# Patient Record
Sex: FEMALE | Race: WHITE
[De-identification: names, ages, dates, MRNs, and addresses within clinical notes are randomized per-mention and may not be internally consistent; named-entity substitution may affect disease eponyms.]

---

## 2020-01-09 ENCOUNTER — HOSPITAL ENCOUNTER (EMERGENCY)
Age: 50
Discharge: HOME | End: 2020-01-09
Payer: MEDICAID

## 2020-01-09 DIAGNOSIS — F17.210: ICD-10-CM

## 2020-01-09 DIAGNOSIS — I10: ICD-10-CM

## 2020-01-09 DIAGNOSIS — M54.5: Primary | ICD-10-CM

## 2020-01-09 DIAGNOSIS — M79.662: ICD-10-CM

## 2020-01-29 ENCOUNTER — HOSPITAL ENCOUNTER (OUTPATIENT)
Age: 50
Discharge: HOME | End: 2020-01-29
Payer: MEDICAID

## 2020-01-29 DIAGNOSIS — M46.1: ICD-10-CM

## 2020-01-29 DIAGNOSIS — M54.5: Primary | ICD-10-CM

## 2021-06-09 ENCOUNTER — HOSPITAL ENCOUNTER (OUTPATIENT)
Dept: HOSPITAL 82 - ORM | Age: 51
LOS: 1 days | Discharge: HOME | End: 2021-06-10
Attending: PAIN MEDICINE
Payer: MEDICAID

## 2021-06-09 VITALS — DIASTOLIC BLOOD PRESSURE: 70 MMHG | SYSTOLIC BLOOD PRESSURE: 114 MMHG

## 2021-06-09 DIAGNOSIS — M54.16: Primary | ICD-10-CM

## 2021-08-18 ENCOUNTER — HOSPITAL ENCOUNTER (OUTPATIENT)
Dept: HOSPITAL 82 - ORM | Age: 51
Discharge: HOME | End: 2021-08-18
Attending: PAIN MEDICINE
Payer: MEDICAID

## 2021-08-18 VITALS — WEIGHT: 160 LBS | BODY MASS INDEX: 27.31 KG/M2 | HEIGHT: 64 IN

## 2021-08-18 VITALS — DIASTOLIC BLOOD PRESSURE: 71 MMHG | SYSTOLIC BLOOD PRESSURE: 127 MMHG

## 2021-08-18 DIAGNOSIS — M47.816: ICD-10-CM

## 2021-08-18 DIAGNOSIS — M54.5: Primary | ICD-10-CM

## 2023-04-04 ENCOUNTER — HOSPITAL ENCOUNTER (OUTPATIENT)
Dept: HOSPITAL 82 - ED | Age: 53
Setting detail: OBSERVATION
LOS: 1 days | Discharge: HOME | End: 2023-04-05
Attending: INTERNAL MEDICINE | Admitting: INTERNAL MEDICINE
Payer: MEDICAID

## 2023-04-04 VITALS — DIASTOLIC BLOOD PRESSURE: 54 MMHG | SYSTOLIC BLOOD PRESSURE: 98 MMHG

## 2023-04-04 VITALS — SYSTOLIC BLOOD PRESSURE: 96 MMHG | DIASTOLIC BLOOD PRESSURE: 57 MMHG

## 2023-04-04 VITALS — DIASTOLIC BLOOD PRESSURE: 44 MMHG | SYSTOLIC BLOOD PRESSURE: 84 MMHG

## 2023-04-04 VITALS — SYSTOLIC BLOOD PRESSURE: 97 MMHG | DIASTOLIC BLOOD PRESSURE: 62 MMHG

## 2023-04-04 VITALS — DIASTOLIC BLOOD PRESSURE: 57 MMHG | SYSTOLIC BLOOD PRESSURE: 101 MMHG

## 2023-04-04 VITALS — DIASTOLIC BLOOD PRESSURE: 62 MMHG | SYSTOLIC BLOOD PRESSURE: 109 MMHG

## 2023-04-04 VITALS — DIASTOLIC BLOOD PRESSURE: 43 MMHG | SYSTOLIC BLOOD PRESSURE: 85 MMHG

## 2023-04-04 VITALS — SYSTOLIC BLOOD PRESSURE: 98 MMHG | DIASTOLIC BLOOD PRESSURE: 48 MMHG

## 2023-04-04 VITALS — DIASTOLIC BLOOD PRESSURE: 70 MMHG | SYSTOLIC BLOOD PRESSURE: 117 MMHG

## 2023-04-04 VITALS — SYSTOLIC BLOOD PRESSURE: 129 MMHG | DIASTOLIC BLOOD PRESSURE: 85 MMHG

## 2023-04-04 VITALS — DIASTOLIC BLOOD PRESSURE: 43 MMHG | SYSTOLIC BLOOD PRESSURE: 90 MMHG

## 2023-04-04 VITALS — DIASTOLIC BLOOD PRESSURE: 51 MMHG | SYSTOLIC BLOOD PRESSURE: 95 MMHG

## 2023-04-04 VITALS — SYSTOLIC BLOOD PRESSURE: 106 MMHG | DIASTOLIC BLOOD PRESSURE: 69 MMHG

## 2023-04-04 VITALS — HEIGHT: 64 IN | WEIGHT: 159.84 LBS | BODY MASS INDEX: 27.29 KG/M2

## 2023-04-04 DIAGNOSIS — I10: ICD-10-CM

## 2023-04-04 DIAGNOSIS — N39.0: Primary | ICD-10-CM

## 2023-04-04 DIAGNOSIS — Z20.822: ICD-10-CM

## 2023-04-04 DIAGNOSIS — E78.00: ICD-10-CM

## 2023-04-04 DIAGNOSIS — F17.200: ICD-10-CM

## 2023-04-04 DIAGNOSIS — F41.9: ICD-10-CM

## 2023-04-04 LAB
ALBUMIN SERPL-MCNC: 4 G/DL (ref 3.2–5)
ALP SERPL-CCNC: 126 U/L (ref 38–126)
ANION GAP SERPL CALCULATED.3IONS-SCNC: 16 MMOL/L
AST SERPL-CCNC: 23 U/L (ref 14–36)
BASOPHILS NFR BLD AUTO: 0.3 % (ref 0–3)
BILIRUB UR QL STRIP.AUTO: NEGATIVE
BUN SERPL-MCNC: 13 MG/DL (ref 7–17)
BUN/CREAT SERPL: 11
CHLORIDE SERPL-SCNC: 104 MMOL/L (ref 95–108)
CO2 SERPL-SCNC: 19 MMOL/L (ref 22–30)
COLOR UR AUTO: YELLOW
CREAT SERPL-MCNC: 1.2 MG/DL (ref 0.5–1)
EOSINOPHIL NFR BLD AUTO: 0.9 % (ref 0–8)
ERYTHROCYTE [DISTWIDTH] IN BLOOD BY AUTOMATED COUNT: 12.5 % (ref 11.5–15.5)
GLUCOSE UR STRIP.AUTO-MCNC: NEGATIVE MG/DL
HCT VFR BLD AUTO: 33.5 % (ref 37–47)
HGB BLD-MCNC: 11.3 G/DL (ref 12–16)
HGB UR QL STRIP.AUTO: (no result)
IMM GRANULOCYTES NFR BLD: 0.3 % (ref 0–5)
KETONES UR STRIP.AUTO-MCNC: NEGATIVE MG/DL
LEUKOCYTE ESTERASE UR QL STRIP.AUTO: (no result)
LYMPHOCYTES NFR BLD: 11.2 % (ref 15–41)
MCH RBC QN AUTO: 31.3 PG  CALC (ref 26–32)
MCHC RBC AUTO-ENTMCNC: 33.7 G/DL CAL (ref 32–36)
MCV RBC AUTO: 92.8 FL  CALC (ref 80–100)
MONOCYTES NFR BLD AUTO: 13.2 % (ref 2–13)
NEUTROPHILS # BLD AUTO: 9.01 THOU/UL (ref 2–7.15)
NEUTROPHILS NFR BLD AUTO: 74.1 % (ref 42–76)
NITRITE UR QL STRIP.AUTO: NEGATIVE
PH UR STRIP.AUTO: 6 [PH] (ref 4.5–8)
PLATELET # BLD AUTO: 335 THOU/UL (ref 130–400)
POTASSIUM SERPL-SCNC: 3.2 MMOL/L (ref 3.5–5.1)
PROT SERPL-MCNC: 7.4 G/DL (ref 6.3–8.2)
PROT UR QL STRIP.AUTO: 100 MG/DL
RBC # BLD AUTO: 3.61 MILL/UL (ref 4.2–5.6)
RBC #/AREA URNS HPF: (no result) RBC/HPF (ref 0–5)
SODIUM SERPL-SCNC: 136 MMOL/L (ref 137–146)
SP GR UR STRIP.AUTO: 1.02
UROBILINOGEN UR QL STRIP.AUTO: 0.2 E.U./DL

## 2023-04-04 PROCEDURE — G0378 HOSPITAL OBSERVATION PER HR: HCPCS

## 2023-04-04 NOTE — NUR
REPORT TO FLOOR.  EKG DONE.  PT TRANSPORTED TO FLOOR VIA STETCHER.  PT
AMBULATORY TO BED UPON ARRIVAL TO FLOOR.  NAD.  PT PLACED ON TELEMETRY.

## 2023-04-05 VITALS — DIASTOLIC BLOOD PRESSURE: 69 MMHG | SYSTOLIC BLOOD PRESSURE: 123 MMHG

## 2023-04-05 VITALS — SYSTOLIC BLOOD PRESSURE: 111 MMHG | DIASTOLIC BLOOD PRESSURE: 53 MMHG

## 2023-04-05 VITALS — DIASTOLIC BLOOD PRESSURE: 83 MMHG | SYSTOLIC BLOOD PRESSURE: 134 MMHG

## 2023-04-05 VITALS — SYSTOLIC BLOOD PRESSURE: 100 MMHG | DIASTOLIC BLOOD PRESSURE: 55 MMHG

## 2023-04-05 VITALS — DIASTOLIC BLOOD PRESSURE: 89 MMHG | SYSTOLIC BLOOD PRESSURE: 139 MMHG

## 2023-04-05 NOTE — NUR
REPORT RECIVED FROM RN IN ED. PATIENT TRANSPORTED TO UNIT VIA STRETCHER AT
APPROX 12:10PM. TRANSFERRED TO BED. AOX X 3. NO C/O OF PAIN AT THIS TIME.
ORIENTED TO ROOM AND CALL LIGHT. INFORMED PT ON FALL SAFETY AND ANSWERED
QUESTIONS. NO FURTHER QUESTIONS OR CONCERNS AT THIS TIME.

## 2023-04-05 NOTE — NUR
PT IN BED WITH HOB UP AWAKE AND ALERT. PT IS UPSET AND CRYING, DOES NOT WANT
TO BE HERE WANTS TO GO HOME. I COMFORTED PT AND DISCUSSED WITH HER THAT WHEN
DR. NOVAK MAKES HIS ROUNDS SHE CAN DISCUSS POSSIBLE D/C.  PT HAS IV TO RIGHT
HAND CLEAN AND INTACT WITH NS @ 100ML/HR. TELE ON WITH ALL LEADS ATTACHED. PT
AMBULATES WELL FOR TOILETING NEEDS. PT HAS CALL LIGHT WITHIN REACH AND ALL
SAFETY MEASURES IN PLACE.

## 2023-04-05 NOTE — NUR
Discharge instructions given. Patient verbalizes understanding of same.
Discharged in stable condition via Ambulatory to Home with
staff. All belongings sent with pt.

## 2023-04-05 NOTE — NUR
PT LAYING IN BED WITH HOB UP, AWAKE AND ALERT. PT HAS NO C/O PAIN AT THIS
TIME. PT HAS NO CHANGE IN STATUS, WILL CONTINUE TO MONITOR PT.